# Patient Record
Sex: FEMALE | Race: WHITE
[De-identification: names, ages, dates, MRNs, and addresses within clinical notes are randomized per-mention and may not be internally consistent; named-entity substitution may affect disease eponyms.]

---

## 2018-08-01 ENCOUNTER — APPOINTMENT (OUTPATIENT)
Dept: HEART AND VASCULAR | Facility: CLINIC | Age: 63
End: 2018-08-01
Payer: MEDICARE

## 2018-08-01 DIAGNOSIS — Z80.9 FAMILY HISTORY OF MALIGNANT NEOPLASM, UNSPECIFIED: ICD-10-CM

## 2018-08-01 DIAGNOSIS — J44.9 CHRONIC OBSTRUCTIVE PULMONARY DISEASE, UNSPECIFIED: ICD-10-CM

## 2018-08-01 DIAGNOSIS — Z87.891 PERSONAL HISTORY OF NICOTINE DEPENDENCE: ICD-10-CM

## 2018-08-01 PROBLEM — Z00.00 ENCOUNTER FOR PREVENTIVE HEALTH EXAMINATION: Status: ACTIVE | Noted: 2018-08-01

## 2018-08-01 PROCEDURE — 99204 OFFICE O/P NEW MOD 45 MIN: CPT | Mod: 25

## 2018-08-01 PROCEDURE — 93000 ELECTROCARDIOGRAM COMPLETE: CPT

## 2018-08-01 PROCEDURE — 93306 TTE W/DOPPLER COMPLETE: CPT

## 2018-08-01 RX ORDER — OMEPRAZOLE 20 MG/1
20 TABLET, DELAYED RELEASE ORAL
Refills: 0 | Status: ACTIVE | COMMUNITY

## 2018-08-01 RX ORDER — ALBUTEROL SULFATE 90 UG/1
AEROSOL, METERED RESPIRATORY (INHALATION)
Refills: 0 | Status: ACTIVE | COMMUNITY

## 2018-08-24 ENCOUNTER — APPOINTMENT (OUTPATIENT)
Dept: HEART AND VASCULAR | Facility: CLINIC | Age: 63
End: 2018-08-24
Payer: MEDICARE

## 2018-08-24 VITALS — SYSTOLIC BLOOD PRESSURE: 146 MMHG | DIASTOLIC BLOOD PRESSURE: 94 MMHG

## 2018-08-24 VITALS
HEIGHT: 64 IN | HEART RATE: 88 BPM | DIASTOLIC BLOOD PRESSURE: 90 MMHG | SYSTOLIC BLOOD PRESSURE: 142 MMHG | WEIGHT: 123 LBS | BODY MASS INDEX: 21 KG/M2

## 2018-08-24 PROCEDURE — 99213 OFFICE O/P EST LOW 20 MIN: CPT | Mod: 25

## 2018-08-31 RX ORDER — LOSARTAN POTASSIUM AND HYDROCHLOROTHIAZIDE 25; 100 MG/1; MG/1
100-25 TABLET ORAL DAILY
Qty: 1 | Refills: 2 | Status: COMPLETED | COMMUNITY
Start: 2018-08-01 | End: 2018-08-31

## 2018-08-31 RX ORDER — AMLODIPINE BESYLATE 2.5 MG/1
2.5 TABLET ORAL DAILY
Qty: 30 | Refills: 3 | Status: DISCONTINUED | COMMUNITY
Start: 2018-08-24 | End: 2018-08-24

## 2018-09-23 ENCOUNTER — APPOINTMENT (OUTPATIENT)
Dept: HEART AND VASCULAR | Facility: CLINIC | Age: 63
End: 2018-09-23
Payer: MEDICARE

## 2018-09-23 VITALS — BODY MASS INDEX: 21.46 KG/M2 | HEART RATE: 84 BPM | WEIGHT: 125 LBS

## 2018-09-23 VITALS — SYSTOLIC BLOOD PRESSURE: 120 MMHG | DIASTOLIC BLOOD PRESSURE: 82 MMHG

## 2018-09-23 PROCEDURE — 99214 OFFICE O/P EST MOD 30 MIN: CPT | Mod: 25

## 2018-09-23 PROCEDURE — 93000 ELECTROCARDIOGRAM COMPLETE: CPT

## 2018-09-23 RX ORDER — RAMIPRIL 10 MG/1
10 CAPSULE ORAL
Qty: 90 | Refills: 0 | Status: DISCONTINUED | COMMUNITY
Start: 2018-07-16

## 2018-09-23 RX ORDER — OMEPRAZOLE 20 MG/1
20 CAPSULE, DELAYED RELEASE ORAL
Qty: 60 | Refills: 0 | Status: DISCONTINUED | COMMUNITY
Start: 2018-07-12

## 2018-09-23 RX ORDER — HYDROCHLOROTHIAZIDE 12.5 MG/1
12.5 TABLET ORAL
Qty: 90 | Refills: 0 | Status: DISCONTINUED | COMMUNITY
Start: 2018-07-05

## 2018-09-23 RX ORDER — LOSARTAN POTASSIUM 100 MG/1
100 TABLET, FILM COATED ORAL
Qty: 30 | Refills: 0 | Status: DISCONTINUED | COMMUNITY
Start: 2018-08-27

## 2018-09-23 RX ORDER — CHLORTHALIDONE 25 MG/1
25 TABLET ORAL
Qty: 30 | Refills: 0 | Status: DISCONTINUED | COMMUNITY
Start: 2018-08-27

## 2019-01-23 ENCOUNTER — RX RENEWAL (OUTPATIENT)
Age: 64
End: 2019-01-23

## 2019-02-06 ENCOUNTER — APPOINTMENT (OUTPATIENT)
Dept: HEART AND VASCULAR | Facility: CLINIC | Age: 64
End: 2019-02-06
Payer: MEDICARE

## 2019-02-06 VITALS
WEIGHT: 125 LBS | HEART RATE: 92 BPM | BODY MASS INDEX: 21.46 KG/M2 | DIASTOLIC BLOOD PRESSURE: 90 MMHG | SYSTOLIC BLOOD PRESSURE: 136 MMHG

## 2019-02-06 VITALS — DIASTOLIC BLOOD PRESSURE: 100 MMHG | SYSTOLIC BLOOD PRESSURE: 160 MMHG | HEART RATE: 104 BPM

## 2019-02-06 PROCEDURE — 99214 OFFICE O/P EST MOD 30 MIN: CPT | Mod: 25

## 2019-02-06 PROCEDURE — 93000 ELECTROCARDIOGRAM COMPLETE: CPT

## 2019-02-06 NOTE — REVIEW OF SYSTEMS
[see HPI] : see HPI [Shortness Of Breath] : shortness of breath [Dyspnea on exertion] : dyspnea during exertion [Dysuria] : no dysuria [Negative] : Heme/Lymph

## 2019-02-06 NOTE — DISCUSSION/SUMMARY
[FreeTextEntry1] : EKG:ST,repeat HR 92bpm( she just took Ventolin in parking lot\par Repeat BP better- needs better BP control- add HCTZ 25mg and RV 1 month- advised to see pulmonary for SOB/COPD

## 2019-02-06 NOTE — HISTORY OF PRESENT ILLNESS
[FreeTextEntry1] : has had more sob with weather( just yesterday) just took Ventolin- no cp,palpitations- states is nervous and her BP goes up

## 2019-02-06 NOTE — PHYSICAL EXAM
[General Appearance - Well Developed] : well developed [Normal Appearance] : normal appearance [Well Groomed] : well groomed [General Appearance - Well Nourished] : well nourished [No Deformities] : no deformities [General Appearance - In No Acute Distress] : no acute distress [Normal Conjunctiva] : the conjunctiva exhibited no abnormalities [Normal Oral Mucosa] : normal oral mucosa [Normal Jugular Venous A Waves Present] : normal jugular venous A waves present [Normal Jugular Venous V Waves Present] : normal jugular venous V waves present [No Jugular Venous Zuniga A Waves] : no jugular venous zuniga A waves [] : no respiratory distress [Respiration, Rhythm And Depth] : normal respiratory rhythm and effort [Exaggerated Use Of Accessory Muscles For Inspiration] : no accessory muscle use [FreeTextEntry1] : marked decreased BS bilaterally [Heart Rate And Rhythm] : heart rate and rhythm were normal [Heart Sounds] : normal S1 and S2 [Murmurs] : no murmurs present [Arterial Pulses Normal] : the arterial pulses were normal [Edema] : no peripheral edema present [Bowel Sounds] : normal bowel sounds [Abdomen Soft] : soft [Abdomen Tenderness] : non-tender [Abnormal Walk] : normal gait [Nail Clubbing] : no clubbing of the fingernails [Skin Color & Pigmentation] : normal skin color and pigmentation [Oriented To Time, Place, And Person] : oriented to person, place, and time

## 2019-04-17 ENCOUNTER — RX RENEWAL (OUTPATIENT)
Age: 64
End: 2019-04-17

## 2019-07-08 ENCOUNTER — RX RENEWAL (OUTPATIENT)
Age: 64
End: 2019-07-08

## 2019-07-31 ENCOUNTER — APPOINTMENT (OUTPATIENT)
Dept: HEART AND VASCULAR | Facility: CLINIC | Age: 64
End: 2019-07-31
Payer: MEDICARE

## 2019-07-31 VITALS
DIASTOLIC BLOOD PRESSURE: 82 MMHG | WEIGHT: 125 LBS | SYSTOLIC BLOOD PRESSURE: 120 MMHG | HEART RATE: 100 BPM | BODY MASS INDEX: 21.46 KG/M2

## 2019-07-31 VITALS — HEART RATE: 84 BPM

## 2019-07-31 DIAGNOSIS — E87.1 HYPO-OSMOLALITY AND HYPONATREMIA: ICD-10-CM

## 2019-07-31 PROCEDURE — 93000 ELECTROCARDIOGRAM COMPLETE: CPT

## 2019-07-31 PROCEDURE — 99214 OFFICE O/P EST MOD 30 MIN: CPT | Mod: 25

## 2019-07-31 RX ORDER — MONTELUKAST SODIUM 10 MG/1
10 TABLET, FILM COATED ORAL
Refills: 0 | Status: DISCONTINUED | COMMUNITY
End: 2019-07-31

## 2019-07-31 RX ORDER — TIOTROPIUM BROMIDE INHALATION SPRAY 3.12 UG/1
2.5 SPRAY, METERED RESPIRATORY (INHALATION)
Refills: 0 | Status: ACTIVE | COMMUNITY

## 2019-07-31 RX ORDER — TIOTROPIUM BROMIDE 18 UG/1
18 CAPSULE ORAL; RESPIRATORY (INHALATION)
Refills: 0 | Status: DISCONTINUED | COMMUNITY
End: 2019-07-31

## 2019-07-31 RX ORDER — HYDROCHLOROTHIAZIDE 25 MG/1
25 TABLET ORAL DAILY
Qty: 30 | Refills: 3 | Status: DISCONTINUED | COMMUNITY
Start: 2019-02-06 | End: 2019-07-31

## 2019-07-31 NOTE — HISTORY OF PRESENT ILLNESS
[FreeTextEntry1] : gets OOB in humidity. inhalers changed by Dr Fontaine\par had labs recently. no cp

## 2019-07-31 NOTE — REVIEW OF SYSTEMS
[see HPI] : see HPI [Shortness Of Breath] : shortness of breath [Dyspnea on exertion] : dyspnea during exertion [Dysuria] : no dysuria [Negative] : Endocrine

## 2019-07-31 NOTE — PHYSICAL EXAM
[General Appearance - Well Developed] : well developed [Well Groomed] : well groomed [Normal Appearance] : normal appearance [General Appearance - Well Nourished] : well nourished [No Deformities] : no deformities [General Appearance - In No Acute Distress] : no acute distress [Normal Conjunctiva] : the conjunctiva exhibited no abnormalities [Normal Oral Mucosa] : normal oral mucosa [Normal Jugular Venous A Waves Present] : normal jugular venous A waves present [Normal Jugular Venous V Waves Present] : normal jugular venous V waves present [No Jugular Venous Zuniga A Waves] : no jugular venous zuniga A waves [] : no respiratory distress [Respiration, Rhythm And Depth] : normal respiratory rhythm and effort [Exaggerated Use Of Accessory Muscles For Inspiration] : no accessory muscle use [FreeTextEntry1] : marked decreased BS bilaterally [Heart Rate And Rhythm] : heart rate and rhythm were normal [Heart Sounds] : normal S1 and S2 [Murmurs] : no murmurs present [Arterial Pulses Normal] : the arterial pulses were normal [Edema] : no peripheral edema present [Bowel Sounds] : normal bowel sounds [Abdomen Soft] : soft [Abdomen Tenderness] : non-tender [Abnormal Walk] : normal gait [Nail Clubbing] : no clubbing of the fingernails [Skin Color & Pigmentation] : normal skin color and pigmentation [Oriented To Time, Place, And Person] : oriented to person, place, and time

## 2019-07-31 NOTE — DISCUSSION/SUMMARY
[FreeTextEntry1] : EKG:NSR,LAHB\par reviewed labs done July 8:Oj=224ee%\par feel hyponatremia due to HCTZ - will DC HCTZ and add Catapres and continue  telmisartan for hptn\par recheck labs

## 2019-08-23 ENCOUNTER — CLINICAL ADVICE (OUTPATIENT)
Age: 64
End: 2019-08-23

## 2019-08-28 ENCOUNTER — APPOINTMENT (OUTPATIENT)
Dept: HEART AND VASCULAR | Facility: CLINIC | Age: 64
End: 2019-08-28
Payer: MEDICARE

## 2019-08-28 VITALS — DIASTOLIC BLOOD PRESSURE: 80 MMHG | SYSTOLIC BLOOD PRESSURE: 134 MMHG | HEART RATE: 92 BPM

## 2019-08-28 DIAGNOSIS — I51.7 CARDIOMEGALY: ICD-10-CM

## 2019-08-28 DIAGNOSIS — I27.20 PULMONARY HYPERTENSION, UNSPECIFIED: ICD-10-CM

## 2019-08-28 PROCEDURE — 93306 TTE W/DOPPLER COMPLETE: CPT

## 2019-08-28 PROCEDURE — 99214 OFFICE O/P EST MOD 30 MIN: CPT | Mod: 25

## 2019-08-28 PROCEDURE — 93000 ELECTROCARDIOGRAM COMPLETE: CPT

## 2019-08-28 NOTE — HISTORY OF PRESENT ILLNESS
[FreeTextEntry1] : had Sx on BID clonidine and taking it once daily- was fine until humidity started again today and OOB- seeing pulmonary

## 2019-08-28 NOTE — DISCUSSION/SUMMARY
[FreeTextEntry1] : EKG:ST\par echo:normal LVEF,mild-mod AR/MR/TR with pulmonary hptn\par sob due to COPD\par cont Catapres + telmisartan for hptn\par f/u with pulmonary for sob/pulmonary HTN

## 2019-08-28 NOTE — PHYSICAL EXAM
[General Appearance - Well Developed] : well developed [Normal Appearance] : normal appearance [Well Groomed] : well groomed [General Appearance - Well Nourished] : well nourished [No Deformities] : no deformities [General Appearance - In No Acute Distress] : no acute distress [Normal Conjunctiva] : the conjunctiva exhibited no abnormalities [Normal Jugular Venous A Waves Present] : normal jugular venous A waves present [Normal Jugular Venous V Waves Present] : normal jugular venous V waves present [No Jugular Venous Zuniga A Waves] : no jugular venous zuniga A waves [] : no respiratory distress [Respiration, Rhythm And Depth] : normal respiratory rhythm and effort [Exaggerated Use Of Accessory Muscles For Inspiration] : no accessory muscle use [FreeTextEntry1] : marked decreased BS bilaterally;decreased air entry [Heart Rate And Rhythm] : heart rate and rhythm were normal [Heart Sounds] : normal S1 and S2 [Murmurs] : no murmurs present [Arterial Pulses Normal] : the arterial pulses were normal [Edema] : no peripheral edema present [Abdomen Soft] : soft [Abnormal Walk] : normal gait [Nail Clubbing] : no clubbing of the fingernails [Skin Color & Pigmentation] : normal skin color and pigmentation [Oriented To Time, Place, And Person] : oriented to person, place, and time

## 2020-01-08 ENCOUNTER — MEDICATION RENEWAL (OUTPATIENT)
Age: 65
End: 2020-01-08

## 2020-02-05 ENCOUNTER — APPOINTMENT (OUTPATIENT)
Dept: HEART AND VASCULAR | Facility: CLINIC | Age: 65
End: 2020-02-05
Payer: MEDICARE

## 2020-02-05 VITALS
SYSTOLIC BLOOD PRESSURE: 126 MMHG | BODY MASS INDEX: 21.46 KG/M2 | DIASTOLIC BLOOD PRESSURE: 84 MMHG | WEIGHT: 125 LBS | HEART RATE: 92 BPM

## 2020-02-05 PROCEDURE — 99214 OFFICE O/P EST MOD 30 MIN: CPT | Mod: 25

## 2020-02-05 PROCEDURE — 93000 ELECTROCARDIOGRAM COMPLETE: CPT

## 2020-02-05 NOTE — HISTORY OF PRESENT ILLNESS
[FreeTextEntry1] : has episodes of SALAS- weather dependent and usually only outdoors- sees Dr Fontaine(pulmonary)- not limiting- no cp,palpitations

## 2020-02-05 NOTE — REVIEW OF SYSTEMS
[see HPI] : see HPI [Shortness Of Breath] : no shortness of breath [Dyspnea on exertion] : dyspnea during exertion [Dysuria] : no dysuria [Negative] : Heme/Lymph

## 2020-02-05 NOTE — PHYSICAL EXAM
[General Appearance - Well Developed] : well developed [Normal Appearance] : normal appearance [Well Groomed] : well groomed [General Appearance - Well Nourished] : well nourished [No Deformities] : no deformities [General Appearance - In No Acute Distress] : no acute distress [Normal Conjunctiva] : the conjunctiva exhibited no abnormalities [Normal Oral Mucosa] : normal oral mucosa [Normal Jugular Venous A Waves Present] : normal jugular venous A waves present [Normal Jugular Venous V Waves Present] : normal jugular venous V waves present [No Jugular Venous Zuniga A Waves] : no jugular venous zuniga A waves [] : no respiratory distress [Respiration, Rhythm And Depth] : normal respiratory rhythm and effort [Exaggerated Use Of Accessory Muscles For Inspiration] : no accessory muscle use [FreeTextEntry1] : mildly decreased BS bilaterally;decreased air entry [Heart Rate And Rhythm] : heart rate and rhythm were normal [Murmurs] : no murmurs present [Heart Sounds] : normal S1 and S2 [Arterial Pulses Normal] : the arterial pulses were normal [Edema] : no peripheral edema present [Bowel Sounds] : normal bowel sounds [Abdomen Soft] : soft [Abdomen Tenderness] : non-tender [Abnormal Walk] : normal gait [Nail Clubbing] : no clubbing of the fingernails [Skin Color & Pigmentation] : normal skin color and pigmentation [Oriented To Time, Place, And Person] : oriented to person, place, and time

## 2020-02-05 NOTE — DISCUSSION/SUMMARY
[FreeTextEntry1] : EKG:NSR,LAHB\par reviewed labs done Sept\par DW=548gg%,LDL=91mg%,YRN=934ik%- no rx for lipids as HDL high\par cont telmisartan + clonidine for hptn; feel SALAS is pulmonary in nature and f/u with pulmonary(Dr Fontaine)\par meds renewed

## 2020-09-11 ENCOUNTER — APPOINTMENT (OUTPATIENT)
Dept: HEART AND VASCULAR | Facility: CLINIC | Age: 65
End: 2020-09-11
Payer: MEDICARE

## 2020-09-11 VITALS
BODY MASS INDEX: 19.74 KG/M2 | DIASTOLIC BLOOD PRESSURE: 98 MMHG | SYSTOLIC BLOOD PRESSURE: 152 MMHG | HEART RATE: 100 BPM | WEIGHT: 115 LBS

## 2020-09-11 VITALS — HEART RATE: 88 BPM | SYSTOLIC BLOOD PRESSURE: 130 MMHG | DIASTOLIC BLOOD PRESSURE: 80 MMHG

## 2020-09-11 PROCEDURE — 93000 ELECTROCARDIOGRAM COMPLETE: CPT

## 2020-09-11 PROCEDURE — 99214 OFFICE O/P EST MOD 30 MIN: CPT | Mod: 25

## 2020-09-11 NOTE — DISCUSSION/SUMMARY
[FreeTextEntry1] : EKG:ST\par repeat vital(pt calmer)improved\par cont telmisartan +clonidine for hptn; diet and repeat labs(with PCP) for lipids.\par meds renewed

## 2020-09-11 NOTE — PHYSICAL EXAM
[General Appearance - Well Developed] : well developed [Normal Appearance] : normal appearance [Well Groomed] : well groomed [No Deformities] : no deformities [General Appearance - Well Nourished] : well nourished [General Appearance - In No Acute Distress] : no acute distress [Normal Conjunctiva] : the conjunctiva exhibited no abnormalities [Normal Oral Mucosa] : normal oral mucosa [No Jugular Venous Zuniga A Waves] : no jugular venous zuniga A waves [Normal Jugular Venous A Waves Present] : normal jugular venous A waves present [Normal Jugular Venous V Waves Present] : normal jugular venous V waves present [] : no respiratory distress [Respiration, Rhythm And Depth] : normal respiratory rhythm and effort [Exaggerated Use Of Accessory Muscles For Inspiration] : no accessory muscle use [Heart Rate And Rhythm] : heart rate and rhythm were normal [Arterial Pulses Normal] : the arterial pulses were normal [Heart Sounds] : normal S1 and S2 [Murmurs] : no murmurs present [Abdomen Soft] : soft [Bowel Sounds] : normal bowel sounds [Abdomen Tenderness] : non-tender [Edema] : no peripheral edema present [Abnormal Walk] : normal gait [Skin Color & Pigmentation] : normal skin color and pigmentation [Nail Clubbing] : no clubbing of the fingernails [Oriented To Time, Place, And Person] : oriented to person, place, and time [FreeTextEntry1] : mildly decreased BS bilaterally;decreased air entry

## 2020-09-11 NOTE — REVIEW OF SYSTEMS
[Anxiety] : anxiety [Negative] : Endocrine [Shortness Of Breath] : no shortness of breath [Dyspnea on exertion] : not dyspnea during exertion [Dysuria] : no dysuria [Memory Lapses Or Loss] : no memory lapses or loss [Under Stress] : not under stress [Depression] : no depression [Suicidal] : not suicidal

## 2020-10-02 NOTE — HISTORY OF PRESENT ILLNESS
[FreeTextEntry1] : very anxious about being out( hasn't been out since pandemic)- just took Ventolin inhaler. no cp,sob.states "never weighed 125lbs"
No

## 2021-03-08 ENCOUNTER — RX RENEWAL (OUTPATIENT)
Age: 66
End: 2021-03-08

## 2021-04-02 ENCOUNTER — APPOINTMENT (OUTPATIENT)
Dept: HEART AND VASCULAR | Facility: CLINIC | Age: 66
End: 2021-04-02

## 2021-08-20 ENCOUNTER — APPOINTMENT (OUTPATIENT)
Dept: HEART AND VASCULAR | Facility: CLINIC | Age: 66
End: 2021-08-20

## 2021-10-01 ENCOUNTER — APPOINTMENT (OUTPATIENT)
Dept: HEART AND VASCULAR | Facility: CLINIC | Age: 66
End: 2021-10-01
Payer: MEDICARE

## 2021-10-01 VITALS — HEART RATE: 112 BPM | DIASTOLIC BLOOD PRESSURE: 90 MMHG | SYSTOLIC BLOOD PRESSURE: 140 MMHG

## 2021-10-01 VITALS — SYSTOLIC BLOOD PRESSURE: 130 MMHG | DIASTOLIC BLOOD PRESSURE: 80 MMHG | HEART RATE: 104 BPM

## 2021-10-01 PROCEDURE — 93000 ELECTROCARDIOGRAM COMPLETE: CPT

## 2021-10-01 PROCEDURE — 99214 OFFICE O/P EST MOD 30 MIN: CPT | Mod: 25

## 2021-10-01 RX ORDER — ERGOCALCIFEROL 1.25 MG/1
1.25 MG CAPSULE, LIQUID FILLED ORAL
Qty: 3 | Refills: 0 | Status: ACTIVE | COMMUNITY
Start: 2021-10-01

## 2021-10-01 RX ORDER — ERGOCALCIFEROL 1.25 MG/1
1.25 MG CAPSULE, LIQUID FILLED ORAL
Qty: 3 | Refills: 0 | Status: DISCONTINUED | COMMUNITY
Start: 2018-05-03 | End: 2021-10-01

## 2021-10-01 RX ORDER — ALBUTEROL SULFATE 90 UG/1
108 (90 BASE) INHALANT RESPIRATORY (INHALATION)
Qty: 3 | Refills: 1 | Status: ACTIVE | COMMUNITY
Start: 2021-10-01

## 2021-10-01 NOTE — REVIEW OF SYSTEMS
[SOB] : shortness of breath [Dyspnea on exertion] : dyspnea during exertion [Negative] : Heme/Lymph [Chest Discomfort] : no chest discomfort [Lower Ext Edema] : no extremity edema [Leg Claudication] : no intermittent leg claudication [Palpitations] : no palpitations [Orthopnea] : no orthopnea [PND] : no PND [Syncope] : no syncope [FreeTextEntry5] : see HPI

## 2021-10-01 NOTE — DISCUSSION/SUMMARY
[FreeTextEntry1] : EKG:Sinus Tachycardia\par feel ST due to inhalers- Bp/HR improved- she will be seeing her pulmonologist in 1-2 weeks and he should adjust meds\par Her SALAS is pulmonary in nature but will get echo\par will get echo for F/U AR and SALAS\par reveiwed labs done August 4,2021\par RKE=368ro%\par discussed diet for lipids( she was having lots of ice cream and is due for f/u labs with PCP); continue clonidine + telmisartan HCT for HPTN

## 2021-10-01 NOTE — HISTORY OF PRESENT ILLNESS
[FreeTextEntry1] : has been ambulating/walking too much- has 1 block SALAS- due to get CT of chest- breathing affected by humidity- worse in hot/humid days- better with cooler weather. no cp/palpitations. no change in breathing pattern past year. put on albuterol( which she just took) and new inhalers

## 2021-10-01 NOTE — PHYSICAL EXAM
[Well Developed] : well developed [Well Nourished] : well nourished [No Acute Distress] : no acute distress [Normal Conjunctiva] : normal conjunctiva [Normal Venous Pressure] : normal venous pressure [No Carotid Bruit] : no carotid bruit [No Murmur] : no murmur [No Rub] : no rub [No Respiratory Distress] : no respiratory distress  [Soft] : abdomen soft [Non Tender] : non-tender [Normal Gait] : normal gait [No Edema] : no edema [No Rash] : no rash [Moves all extremities] : moves all extremities [Alert and Oriented] : alert and oriented [de-identified] : tachycardia [de-identified] : decreased air entry with scattered rhonchi

## 2021-11-19 ENCOUNTER — APPOINTMENT (OUTPATIENT)
Dept: HEART AND VASCULAR | Facility: CLINIC | Age: 66
End: 2021-11-19

## 2021-12-10 ENCOUNTER — APPOINTMENT (OUTPATIENT)
Dept: HEART AND VASCULAR | Facility: CLINIC | Age: 66
End: 2021-12-10
Payer: MEDICARE

## 2021-12-10 VITALS — HEART RATE: 100 BPM | DIASTOLIC BLOOD PRESSURE: 80 MMHG | SYSTOLIC BLOOD PRESSURE: 170 MMHG

## 2021-12-10 VITALS — DIASTOLIC BLOOD PRESSURE: 78 MMHG | SYSTOLIC BLOOD PRESSURE: 166 MMHG

## 2021-12-10 PROCEDURE — 93000 ELECTROCARDIOGRAM COMPLETE: CPT

## 2021-12-10 PROCEDURE — 99214 OFFICE O/P EST MOD 30 MIN: CPT | Mod: 25

## 2021-12-10 RX ORDER — BUDESONIDE 0.25 MG/2ML
0.25 INHALANT ORAL
Refills: 0 | Status: ACTIVE | COMMUNITY

## 2021-12-10 NOTE — PHYSICAL EXAM
[Well Developed] : well developed [Well Nourished] : well nourished [No Acute Distress] : no acute distress [Normal Conjunctiva] : normal conjunctiva [Normal Venous Pressure] : normal venous pressure [No Carotid Bruit] : no carotid bruit [No Murmur] : no murmur [No Rub] : no rub [No Respiratory Distress] : no respiratory distress  [Soft] : abdomen soft [Non Tender] : non-tender [Normal Gait] : normal gait [No Edema] : no edema [No Rash] : no rash [Moves all extremities] : moves all extremities [Alert and Oriented] : alert and oriented [de-identified] : tachycardia [de-identified] : decreased air entry

## 2021-12-10 NOTE — DISCUSSION/SUMMARY
[FreeTextEntry1] : EKG:ST\par reveiwed labs done Oct\par BP not controlled/? due to inhalers- her so is improved with inhalers so i feel pulmonary and not cardiac\par will add hydralazine and cont telmisartan+ clonidine for BP- advised PCP BP f/u in 2 weeks

## 2022-01-07 ENCOUNTER — APPOINTMENT (OUTPATIENT)
Dept: HEART AND VASCULAR | Facility: CLINIC | Age: 67
End: 2022-01-07

## 2022-01-26 ENCOUNTER — APPOINTMENT (OUTPATIENT)
Dept: HEART AND VASCULAR | Facility: CLINIC | Age: 67
End: 2022-01-26
Payer: MEDICARE

## 2022-01-26 VITALS — DIASTOLIC BLOOD PRESSURE: 78 MMHG | SYSTOLIC BLOOD PRESSURE: 146 MMHG | HEART RATE: 108 BPM

## 2022-01-26 PROCEDURE — 99214 OFFICE O/P EST MOD 30 MIN: CPT | Mod: 25

## 2022-01-26 PROCEDURE — 93000 ELECTROCARDIOGRAM COMPLETE: CPT

## 2022-01-26 RX ORDER — FLUTICASONE PROPIONATE AND SALMETEROL 250; 50 UG/1; UG/1
250-50 POWDER RESPIRATORY (INHALATION) TWICE DAILY
Qty: 1 | Refills: 0 | Status: DISCONTINUED | COMMUNITY
Start: 2021-10-01 | End: 2022-01-26

## 2022-01-26 NOTE — DISCUSSION/SUMMARY
[FreeTextEntry1] : EKG:ST\par just took nebulizer\par BP a bit elevated but anxious\par reveiwed labs LDL mildly elevated- discussed diet for lipids\par continue Catapres +hydralazine + telmisartan for HPTN\par RV echo for SALAS/MR - i feel her SALAS is all pulmonary- no rx for tachycardia as I feel related to anxiety/ nebulizer

## 2022-01-26 NOTE — PHYSICAL EXAM
[Well Developed] : well developed [Well Nourished] : well nourished [No Acute Distress] : no acute distress [Normal Conjunctiva] : normal conjunctiva [Normal Venous Pressure] : normal venous pressure [No Carotid Bruit] : no carotid bruit [No Murmur] : no murmur [No Rub] : no rub [No Respiratory Distress] : no respiratory distress  [Soft] : abdomen soft [Non Tender] : non-tender [Normal Gait] : normal gait [No Edema] : no edema [No Rash] : no rash [Moves all extremities] : moves all extremities [Alert and Oriented] : alert and oriented [de-identified] : tachycardia [de-identified] : marked decreased air entry

## 2022-01-26 NOTE — HISTORY OF PRESENT ILLNESS
[FreeTextEntry1] : gets very anxious, no cp- SALAS improved with nebulizers- sees pulmonary q 3months

## 2022-04-08 ENCOUNTER — APPOINTMENT (OUTPATIENT)
Dept: HEART AND VASCULAR | Facility: CLINIC | Age: 67
End: 2022-04-08
Payer: MEDICARE

## 2022-04-08 PROCEDURE — 93306 TTE W/DOPPLER COMPLETE: CPT

## 2022-06-29 ENCOUNTER — APPOINTMENT (OUTPATIENT)
Dept: HEART AND VASCULAR | Facility: CLINIC | Age: 67
End: 2022-06-29

## 2022-06-29 VITALS — SYSTOLIC BLOOD PRESSURE: 120 MMHG | DIASTOLIC BLOOD PRESSURE: 70 MMHG | HEART RATE: 108 BPM

## 2022-06-29 VITALS — SYSTOLIC BLOOD PRESSURE: 162 MMHG | HEART RATE: 112 BPM | DIASTOLIC BLOOD PRESSURE: 78 MMHG

## 2022-06-29 DIAGNOSIS — I10 ESSENTIAL (PRIMARY) HYPERTENSION: ICD-10-CM

## 2022-06-29 DIAGNOSIS — R00.0 TACHYCARDIA, UNSPECIFIED: ICD-10-CM

## 2022-06-29 PROCEDURE — 93000 ELECTROCARDIOGRAM COMPLETE: CPT

## 2022-06-29 PROCEDURE — 99214 OFFICE O/P EST MOD 30 MIN: CPT | Mod: 25

## 2022-06-29 RX ORDER — CHOLECALCIFEROL (VITAMIN D3) 125 MCG
TABLET ORAL
Refills: 0 | Status: DISCONTINUED | COMMUNITY
End: 2022-06-29

## 2022-06-29 NOTE — PHYSICAL EXAM
[Well Developed] : well developed [Well Nourished] : well nourished [No Acute Distress] : no acute distress [Normal Conjunctiva] : normal conjunctiva [Normal Venous Pressure] : normal venous pressure [No Carotid Bruit] : no carotid bruit [No Murmur] : no murmur [No Rub] : no rub [No Respiratory Distress] : no respiratory distress  [Soft] : abdomen soft [Non Tender] : non-tender [Normal Gait] : normal gait [No Edema] : no edema [No Rash] : no rash [Moves all extremities] : moves all extremities [Alert and Oriented] : alert and oriented [de-identified] : tachycardia [de-identified] : decreased air entry

## 2022-06-29 NOTE — DISCUSSION/SUMMARY
[FreeTextEntry1] : EKG:ST,LAHB\par reviewed recent labs from May-LDL improved\par repeat BP better\par feel st due to multiple inhalers which pt took < 1 hour ago\par continue telmisartan + clonidine + Hydralazine for BP,diet for lipids\par meds renewed\par

## 2023-07-05 ENCOUNTER — NON-APPOINTMENT (OUTPATIENT)
Age: 68
End: 2023-07-05

## 2023-07-05 ENCOUNTER — APPOINTMENT (OUTPATIENT)
Dept: HEART AND VASCULAR | Facility: CLINIC | Age: 68
End: 2023-07-05
Payer: MEDICARE

## 2023-07-05 VITALS
HEART RATE: 126 BPM | WEIGHT: 94 LBS | OXYGEN SATURATION: 89 % | BODY MASS INDEX: 16.05 KG/M2 | DIASTOLIC BLOOD PRESSURE: 79 MMHG | TEMPERATURE: 97.4 F | SYSTOLIC BLOOD PRESSURE: 146 MMHG | HEIGHT: 64 IN

## 2023-07-05 VITALS — SYSTOLIC BLOOD PRESSURE: 136 MMHG | DIASTOLIC BLOOD PRESSURE: 76 MMHG

## 2023-07-05 PROCEDURE — 99214 OFFICE O/P EST MOD 30 MIN: CPT | Mod: 25

## 2023-07-05 PROCEDURE — 93000 ELECTROCARDIOGRAM COMPLETE: CPT

## 2023-07-05 RX ORDER — HYDROXYZINE HYDROCHLORIDE 10 MG/1
10 TABLET ORAL
Qty: 60 | Refills: 0 | Status: ACTIVE | COMMUNITY
Start: 2023-03-25

## 2023-07-05 RX ORDER — FERROUS SULFATE TAB 325 MG (65 MG ELEMENTAL FE) 325 (65 FE) MG
325 (65 FE) TAB ORAL
Qty: 30 | Refills: 0 | Status: DISCONTINUED | COMMUNITY
Start: 2023-03-25

## 2023-07-05 RX ORDER — POTASSIUM CHLORIDE 750 MG/1
10 TABLET, FILM COATED, EXTENDED RELEASE ORAL
Qty: 30 | Refills: 0 | Status: DISCONTINUED | COMMUNITY
Start: 2023-03-25

## 2023-07-05 RX ORDER — ASCORBIC ACID 500 MG
500 TABLET ORAL
Qty: 30 | Refills: 0 | Status: ACTIVE | COMMUNITY
Start: 2023-03-25

## 2023-07-05 NOTE — HISTORY OF PRESENT ILLNESS
[FreeTextEntry1] : baseline sob- using O2/24h a day. no cp/palpitations\par due to see pCP soon and get labs

## 2023-09-14 NOTE — PHYSICAL EXAM
[Well Developed] : well developed [Well Nourished] : well nourished [No Acute Distress] : no acute distress [Normal Conjunctiva] : normal conjunctiva [Normal Venous Pressure] : normal venous pressure [No Carotid Bruit] : no carotid bruit [No Murmur] : no murmur [No Rub] : no rub [No Respiratory Distress] : no respiratory distress  [Soft] : abdomen soft [Non Tender] : non-tender [Normal Gait] : normal gait [No Edema] : no edema [No Rash] : no rash [Moves all extremities] : moves all extremities [Alert and Oriented] : alert and oriented [de-identified] : using O2 [de-identified] : tachycardia [de-identified] : decreased air entry  Cephalic

## 2023-10-11 ENCOUNTER — APPOINTMENT (OUTPATIENT)
Dept: CARDIOLOGY | Facility: CLINIC | Age: 68
End: 2023-10-11

## 2023-10-24 ENCOUNTER — RX RENEWAL (OUTPATIENT)
Age: 68
End: 2023-10-24

## 2023-11-27 ENCOUNTER — RX RENEWAL (OUTPATIENT)
Age: 68
End: 2023-11-27

## 2023-12-28 ENCOUNTER — RX RENEWAL (OUTPATIENT)
Age: 68
End: 2023-12-28

## 2024-01-24 ENCOUNTER — APPOINTMENT (OUTPATIENT)
Dept: CARDIOLOGY | Facility: CLINIC | Age: 69
End: 2024-01-24

## 2024-02-22 ENCOUNTER — RX RENEWAL (OUTPATIENT)
Age: 69
End: 2024-02-22

## 2024-03-26 ENCOUNTER — APPOINTMENT (OUTPATIENT)
Dept: CARDIOLOGY | Facility: CLINIC | Age: 69
End: 2024-03-26
Payer: MEDICARE

## 2024-03-26 DIAGNOSIS — I35.1 NONRHEUMATIC AORTIC (VALVE) INSUFFICIENCY: ICD-10-CM

## 2024-03-26 PROCEDURE — 93306 TTE W/DOPPLER COMPLETE: CPT

## 2024-04-02 ENCOUNTER — RX RENEWAL (OUTPATIENT)
Age: 69
End: 2024-04-02

## 2024-04-26 ENCOUNTER — NON-APPOINTMENT (OUTPATIENT)
Age: 69
End: 2024-04-26

## 2024-04-26 ENCOUNTER — APPOINTMENT (OUTPATIENT)
Dept: HEART AND VASCULAR | Facility: CLINIC | Age: 69
End: 2024-04-26
Payer: MEDICARE

## 2024-04-26 VITALS
WEIGHT: 100 LBS | HEART RATE: 114 BPM | TEMPERATURE: 98 F | OXYGEN SATURATION: 87 % | HEIGHT: 64 IN | BODY MASS INDEX: 17.07 KG/M2 | SYSTOLIC BLOOD PRESSURE: 157 MMHG | DIASTOLIC BLOOD PRESSURE: 90 MMHG

## 2024-04-26 DIAGNOSIS — E78.00 PURE HYPERCHOLESTEROLEMIA, UNSPECIFIED: ICD-10-CM

## 2024-04-26 DIAGNOSIS — I10 ESSENTIAL (PRIMARY) HYPERTENSION: ICD-10-CM

## 2024-04-26 DIAGNOSIS — R06.09 OTHER FORMS OF DYSPNEA: ICD-10-CM

## 2024-04-26 DIAGNOSIS — R06.02 SHORTNESS OF BREATH: ICD-10-CM

## 2024-04-26 PROCEDURE — 99214 OFFICE O/P EST MOD 30 MIN: CPT

## 2024-04-26 PROCEDURE — 93000 ELECTROCARDIOGRAM COMPLETE: CPT

## 2024-04-26 PROCEDURE — G2211 COMPLEX E/M VISIT ADD ON: CPT

## 2024-04-26 NOTE — PHYSICAL EXAM
[Well Developed] : well developed [Well Nourished] : well nourished [No Acute Distress] : no acute distress [Normal Conjunctiva] : normal conjunctiva [Normal Venous Pressure] : normal venous pressure [No Carotid Bruit] : no carotid bruit [No Murmur] : no murmur [No Rub] : no rub [No Respiratory Distress] : no respiratory distress  [Soft] : abdomen soft [Non Tender] : non-tender [Normal Gait] : normal gait [No Edema] : no edema [No Rash] : no rash [Moves all extremities] : moves all extremities [Alert and Oriented] : alert and oriented [de-identified] : using O2 [de-identified] : tachycardia [de-identified] : decreased air entry

## 2024-04-26 NOTE — DISCUSSION/SUMMARY
[FreeTextEntry1] : EKG:ST,LAHB feel sob is pulmonary continue hydralazine + clonidine + telmisartan for HPTN;reveiwed labs -TC/LDL mildly elevated but HDL.>90mg% diet for hLD

## 2024-05-20 ENCOUNTER — RX RENEWAL (OUTPATIENT)
Age: 69
End: 2024-05-20

## 2024-05-20 RX ORDER — HYDRALAZINE HYDROCHLORIDE 10 MG/1
10 TABLET ORAL
Qty: 180 | Refills: 1 | Status: ACTIVE | COMMUNITY
Start: 2021-12-10 | End: 1900-01-01

## 2024-05-20 RX ORDER — TELMISARTAN AND HYDROCHLOROTHIAZIDE 80; 25 MG/1; MG/1
80-25 TABLET ORAL
Qty: 90 | Refills: 1 | Status: ACTIVE | COMMUNITY
Start: 2018-08-31 | End: 1900-01-01

## 2024-06-28 ENCOUNTER — RX RENEWAL (OUTPATIENT)
Age: 69
End: 2024-06-28

## 2024-09-19 ENCOUNTER — NON-APPOINTMENT (OUTPATIENT)
Age: 69
End: 2024-09-19

## 2024-09-20 ENCOUNTER — NON-APPOINTMENT (OUTPATIENT)
Age: 69
End: 2024-09-20

## 2024-09-20 ENCOUNTER — APPOINTMENT (OUTPATIENT)
Dept: HEART AND VASCULAR | Facility: CLINIC | Age: 69
End: 2024-09-20
Payer: MEDICARE

## 2024-09-20 VITALS
WEIGHT: 100 LBS | SYSTOLIC BLOOD PRESSURE: 132 MMHG | BODY MASS INDEX: 17.07 KG/M2 | HEART RATE: 115 BPM | TEMPERATURE: 97.8 F | HEIGHT: 64 IN | DIASTOLIC BLOOD PRESSURE: 81 MMHG | OXYGEN SATURATION: 87 %

## 2024-09-20 DIAGNOSIS — E78.00 PURE HYPERCHOLESTEROLEMIA, UNSPECIFIED: ICD-10-CM

## 2024-09-20 DIAGNOSIS — I10 ESSENTIAL (PRIMARY) HYPERTENSION: ICD-10-CM

## 2024-09-20 DIAGNOSIS — R06.09 OTHER FORMS OF DYSPNEA: ICD-10-CM

## 2024-09-20 PROCEDURE — 93000 ELECTROCARDIOGRAM COMPLETE: CPT

## 2024-09-20 PROCEDURE — G2211 COMPLEX E/M VISIT ADD ON: CPT

## 2024-09-20 PROCEDURE — 99214 OFFICE O/P EST MOD 30 MIN: CPT

## 2024-09-20 NOTE — DISCUSSION/SUMMARY
[FreeTextEntry1] : EKG:ST her SALAS is pulmonary and f/u with pulmonologist continue hydralazine + clonidine +telmisartan for HPTN; diet for HLD( no treatment as HDL > 90mg^ and lDL minimally eleavted

## 2024-09-20 NOTE — PHYSICAL EXAM
[Well Developed] : well developed [Well Nourished] : well nourished [No Acute Distress] : no acute distress [Normal Conjunctiva] : normal conjunctiva [Normal Venous Pressure] : normal venous pressure [No Carotid Bruit] : no carotid bruit [No Murmur] : no murmur [No Rub] : no rub [No Respiratory Distress] : no respiratory distress  [Soft] : abdomen soft [Non Tender] : non-tender [Normal Gait] : normal gait [No Edema] : no edema [No Rash] : no rash [Moves all extremities] : moves all extremities [Alert and Oriented] : alert and oriented [de-identified] : using O2 [de-identified] : tachycardia [de-identified] : decreased air entry

## 2024-12-26 ENCOUNTER — RX RENEWAL (OUTPATIENT)
Age: 69
End: 2024-12-26

## 2025-01-02 ENCOUNTER — RX RENEWAL (OUTPATIENT)
Age: 70
End: 2025-01-02

## 2025-04-16 ENCOUNTER — APPOINTMENT (OUTPATIENT)
Dept: CARDIOLOGY | Facility: CLINIC | Age: 70
End: 2025-04-16
Payer: MEDICARE

## 2025-04-16 DIAGNOSIS — I07.1 RHEUMATIC TRICUSPID INSUFFICIENCY: ICD-10-CM

## 2025-04-16 PROCEDURE — 93306 TTE W/DOPPLER COMPLETE: CPT

## 2025-04-17 PROBLEM — I07.1 TRICUSPID INSUFFICIENCY: Status: ACTIVE | Noted: 2025-04-17

## 2025-04-23 ENCOUNTER — APPOINTMENT (OUTPATIENT)
Dept: HEART AND VASCULAR | Facility: CLINIC | Age: 70
End: 2025-04-23
Payer: MEDICARE

## 2025-04-23 ENCOUNTER — NON-APPOINTMENT (OUTPATIENT)
Age: 70
End: 2025-04-23

## 2025-04-23 VITALS
OXYGEN SATURATION: 97 % | HEIGHT: 64 IN | DIASTOLIC BLOOD PRESSURE: 82 MMHG | TEMPERATURE: 98 F | SYSTOLIC BLOOD PRESSURE: 143 MMHG | HEART RATE: 117 BPM | WEIGHT: 95 LBS | BODY MASS INDEX: 16.22 KG/M2

## 2025-04-23 DIAGNOSIS — I10 ESSENTIAL (PRIMARY) HYPERTENSION: ICD-10-CM

## 2025-04-23 DIAGNOSIS — I49.3 VENTRICULAR PREMATURE DEPOLARIZATION: ICD-10-CM

## 2025-04-23 DIAGNOSIS — R06.02 SHORTNESS OF BREATH: ICD-10-CM

## 2025-04-23 DIAGNOSIS — E78.00 PURE HYPERCHOLESTEROLEMIA, UNSPECIFIED: ICD-10-CM

## 2025-04-23 PROCEDURE — 93000 ELECTROCARDIOGRAM COMPLETE: CPT

## 2025-04-23 PROCEDURE — 99214 OFFICE O/P EST MOD 30 MIN: CPT

## 2025-04-23 PROCEDURE — G2211 COMPLEX E/M VISIT ADD ON: CPT

## 2025-05-16 ENCOUNTER — RX RENEWAL (OUTPATIENT)
Age: 70
End: 2025-05-16

## 2025-06-27 ENCOUNTER — RX RENEWAL (OUTPATIENT)
Age: 70
End: 2025-06-27